# Patient Record
Sex: FEMALE | URBAN - METROPOLITAN AREA
[De-identification: names, ages, dates, MRNs, and addresses within clinical notes are randomized per-mention and may not be internally consistent; named-entity substitution may affect disease eponyms.]

---
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
## 2024-03-12 ENCOUNTER — OFFICE VISIT (OUTPATIENT)
Dept: FAMILY MEDICINE CLINIC | Facility: CLINIC | Age: 19
End: 2024-03-12
Payer: COMMERCIAL

## 2024-03-12 VITALS
SYSTOLIC BLOOD PRESSURE: 114 MMHG | RESPIRATION RATE: 16 BRPM | BODY MASS INDEX: 33.05 KG/M2 | WEIGHT: 196 LBS | DIASTOLIC BLOOD PRESSURE: 70 MMHG | OXYGEN SATURATION: 98 % | TEMPERATURE: 98 F | HEIGHT: 64.5 IN | HEART RATE: 86 BPM

## 2024-03-12 DIAGNOSIS — Z00.00 ROUTINE MEDICAL EXAM: Primary | ICD-10-CM

## 2024-03-12 DIAGNOSIS — E66.09 CLASS 1 OBESITY DUE TO EXCESS CALORIES WITHOUT SERIOUS COMORBIDITY WITH BODY MASS INDEX (BMI) OF 33.0 TO 33.9 IN ADULT: ICD-10-CM

## 2024-03-12 PROCEDURE — 99395 PREV VISIT EST AGE 18-39: CPT | Performed by: FAMILY MEDICINE

## 2024-03-12 NOTE — PROGRESS NOTES
Chief Complaint   Patient presents with    Physical       HPI:  Isatu Hudson is a 18 year old female here today for preventative visit.      Imms- up to date with tdap & flu. Will discuss covid.      Cervical cancer screening- Never had a pap yet.       Breast cancer screening- Mgma with breast cancer. Jewels's sister had ovarian cancer.      Colon cancer screening- No early family h/o colon cancer.       Osteoporosis screening- no reason identified for early screening with dexa      Diet/exercise- stresses over her weight. Works on it x 1 mo then when doesn't see progress she gets upset and gives up.  Cooking for herself nightly and making better food choices.   Goal- walk 3d/wk 1-3mi  -loose 5# in 1 month  -eat healthier getting a fruit/veggie    Breakfast- yogurt, fruit, coffee little bit of cre  am.   Lunch- avocado toast with egg, water  Dinner- pork tenderloin, brussel sprouts, salad, water  Snack- chocolate ice cream 2 scoops        Dental/Eye Check up-  Recommended pt see dentist once every 6 months for a cleaning and once every year for an eye exam.        History reviewed. No pertinent past medical history.  Past Surgical History:   Procedure Laterality Date    WISDOM TEETH REMOVED Bilateral 11/27/2023     No current outpatient medications on file prior to visit.     No current facility-administered medications on file prior to visit.     No Known Allergies  Social History     Socioeconomic History    Marital status: Single     Spouse name: Not on file    Number of children: Not on file    Years of education: Not on file    Highest education level: Not on file   Occupational History    Occupation:  Worker   Tobacco Use    Smoking status: Never    Smokeless tobacco: Never   Vaping Use    Vaping Use: Never used   Substance and Sexual Activity    Alcohol use: Never    Drug use: Never    Sexual activity: Yes     Partners: Male     Birth control/protection: Condom   Other Topics Concern     Caffeine Concern No    Exercise No    Seat Belt No    Special Diet No    Stress Concern No    Weight Concern No   Social History Narrative    Not on file     Social Determinants of Health     Financial Resource Strain: Not on file   Food Insecurity: Not on file   Transportation Needs: Not on file   Physical Activity: Not on file   Stress: Not on file   Social Connections: Not on file   Housing Stability: Not on file     Family History   Problem Relation Age of Onset    No Known Problems Mother     No Known Problems Father     No Known Problems Brother     No Known Problems Brother     Breast Cancer Maternal Grandmother 60    Heart Attack Maternal Grandfather     No Known Problems Paternal Grandmother     Other (hungtington's disease) Paternal Grandfather     Ovarian Cancer Other        Review of Systems - All systems reviewed and negative except for HPI    PHYSICAL EXAM:  /70   Pulse 86   Temp 98.3 °F (36.8 °C) (Temporal)   Resp 16   Ht 5' 4.5\" (1.638 m)   Wt 196 lb (88.9 kg)   LMP 03/02/2024 (Approximate)   SpO2 98%   BMI 33.12 kg/m²   GENERAL APPEARANCE:  Alert, no acute distress, appears stated age  HEENT:  Head- Normocephalic, atraumatic.    Eyes- Extraocular movements intact, pupils equally round and reactive to light,  conjunctivae normal.    Ears- Tympanic membranes intact bilaterally.    Nose- Patent, normal septum and turbinates.    Mouth/Throat- Normal oral mucosa, throat non-erythematous.  NECK:  No submental, submandibular, ant/post cervical lymphadenopathy. No thyromegaly or masses.  PULMONARY:  Lungs clear to auscultation bilaterally. No wheezes, rales, or rhonchi. Normal respiratory effort.  CARDIOVASCULAR:  Regular rate and rhythm. No murmurs, gallops, or rubs.  ABDOMEN:  + bowel sounds, soft, nontender, nondistended. No hepatomegaly.  MUSCULOSKELETAL: Strength of upper and lower extremities 5/5 bilaterally. Normal gait.  NEUROLOGIC:  Cranial nerves 2-12 grossly intact.  PSYCHIATRIC:   Normal mood, affect, and hygiene.     ASSESSMENT/PLAN:    1. Routine medical exam    2. Class 1 obesity due to excess calories without serious comorbidity with body mass index (BMI) of 33.0 to 33.9 in adult  -discussed healthy diet, having fresh fruit/veggie platter ready for the week  -discussed making realistic goals and celebrating progress        Patient verbalized understanding and agrees to plan.      Return for lifestlye changes if still struggling when back from end of semester.

## 2025-05-15 ENCOUNTER — OFFICE VISIT (OUTPATIENT)
Facility: CLINIC | Age: 20
End: 2025-05-15
Payer: COMMERCIAL

## 2025-05-15 VITALS
BODY MASS INDEX: 36.43 KG/M2 | DIASTOLIC BLOOD PRESSURE: 76 MMHG | SYSTOLIC BLOOD PRESSURE: 112 MMHG | HEIGHT: 64.5 IN | WEIGHT: 216 LBS

## 2025-05-15 DIAGNOSIS — Z12.4 SCREENING FOR CERVICAL CANCER: ICD-10-CM

## 2025-05-15 DIAGNOSIS — Z80.3 FAMILY HISTORY OF BREAST CANCER: ICD-10-CM

## 2025-05-15 DIAGNOSIS — Z11.3 SCREENING FOR STDS (SEXUALLY TRANSMITTED DISEASES): ICD-10-CM

## 2025-05-15 DIAGNOSIS — Z01.419 WELL WOMAN EXAM WITH ROUTINE GYNECOLOGICAL EXAM: Primary | ICD-10-CM

## 2025-05-15 PROCEDURE — 99385 PREV VISIT NEW AGE 18-39: CPT

## 2025-05-15 NOTE — PROGRESS NOTES
GYN H&P     Genetic questionnaire reviewed with the patient and she will be referred for genetic counseling if the questionnaire had any positive results.    The [facility] intake form was also reviewed regarding contraception, menstrual periods, urinary health, and vaginal / sexual health    5/15/2025  9:38 AM    Chief Complaint   Patient presents with    Physical     Pt is here for annual exam. To have first pap today       HPI: Isatu is a 19 year old  Patient's last menstrual period was 2025 (exact date).  (contraception: condoms) here for her annual gyn exam.     She has no complaints.   Menses are regular, Q26 days and lasting 5 days in length. Denies any pelvic or breast complaints.  Satisfied with current contraception. Declines hormonal contraception.    Family hx of breast cancer in her maternal grandmother at age 45. Reports her mother has had genetic testing done and was negative. The patient herself has not had genetic testing done but is not interested at this time.    Previous encounters and chart reviewed.     OB History    Para Term  AB Living   0 0 0 0 0 0   SAB IAB Ectopic Multiple Live Births   0 0 0 0 0       GYN hx:   Menarche: 9  Period Cycle (Days): 26-28  Period Duration (Days): 5  Use of Birth Control (if yes, specify type): Condoms  Hx Prior Abnormal Pap: No  Follow Up Recommendation: today      Past Medical History[1]  Past Surgical History[2]  Allergies[3]  Medications Ordered Prior to Encounter[4]  Family History[5]  Social History     Socioeconomic History    Marital status: Single     Spouse name: Not on file    Number of children: Not on file    Years of education: Not on file    Highest education level: Not on file   Occupational History    Occupation:  Worker   Tobacco Use    Smoking status: Never    Smokeless tobacco: Never   Vaping Use    Vaping status: Never Used   Substance and Sexual Activity    Alcohol use: Yes     Alcohol/week:  1.0 standard drink of alcohol     Types: 1 Standard drinks or equivalent per week    Drug use: Never    Sexual activity: Yes     Partners: Male     Birth control/protection: Condom   Other Topics Concern    Caffeine Concern No    Exercise No    Seat Belt No    Special Diet No    Stress Concern No    Weight Concern No   Social History Narrative    Not on file     Social Drivers of Health     Food Insecurity: No Food Insecurity (5/15/2025)    NCSS - Food Insecurity     Worried About Running Out of Food in the Last Year: No     Ran Out of Food in the Last Year: No   Transportation Needs: No Transportation Needs (5/15/2025)    NCSS - Transportation     Lack of Transportation: No   Stress: Not on file   Housing Stability: Not At Risk (5/15/2025)    NCSS - Housing/Utilities     Has Housing: Yes     Worried About Losing Housing: No     Unable to Get Utilities: No       ROS:     Review of Systems:  General: denies fevers, chills, fatigue and malaise.   Eyes: no visual changes, denies headaches  ENT: no complaints, denies earaches, runny nose, epistaxis, throat pain or sore throat  Respiratory: denies SOB, dyspnea, cough or wheezing  Cardiovascular: denies chest pain, palpitations, exercise intolerance   GI: denies abdominal pain, diarrhea, constipation  : no complaints, denies dysuria, increased urinary frequency. Menses regular, no dysmenorrhea, no menorrhagia, no dyspareunia   Hematological/lymphatic: denies history of excessive bleeding or bruising, denies dizziness, lightheadedness.   Breast: denies rashes, skin changes, pain, lumps or discharge   Psychiatric: denies depression, changes in sleep patterns, anxiety  Endocrine: denies hot or cold intolerance, mood changes   Neurological: denies changes in sight, smell, hearing or taste. Denies seizures or tremors  Immunological: denies allergies, denies anaphylaxis, or swollen lymph nodes  Musculoskeletal: denies joint pain, morning stiffness, decreased range of motion          O /76   Ht 64.5\"   Wt 216 lb (98 kg)   LMP 05/03/2025 (Exact Date)   BMI 36.50 kg/m²         Wt Readings from Last 6 Encounters:   05/15/25 216 lb (98 kg)   03/12/24 196 lb (88.9 kg)   07/12/22 188 lb (85.3 kg) (96%, Z= 1.78)*     * Growth percentiles are based on [facility] (Girls, 2-20 Years) data.     Exam:   GENERAL: well developed, well nourished, in no apparent distress, oriented.  SKIN: no rashes, no suspicious lesions  HEENT: normal  NECK: supple; no thyromegaly, no adenopathy  LUNGS: clear to auscultation  CARDIOVASCULAR: normal S1, S2, RRR  BREASTS: soft, nontender, no palpable masses or nodes, no nipple discharge, no skin changes, no axillary adenopathy  ABDOMEN: no scars,  soft, non distended; non tender, no masses  PELVIC: External Genitalia: Normal appearing, no lesions.    Vagina: normal pink mucosa, no lesions, normal clear discharge.    Bladder well supported.  No  anterior or posterior hernias    Cervix: nulliparous, no lesions , No CMT     Uterus: AVAF, mobile, non tender, normal size    Adnexa: non tender, no masses, normal size    Rectal: deferred  EXTREMITIES:  non tender without edema             Patient counseled on:    Diet/exercise.      Self Breast Exams     Safe sex practices / and living environment          Pap: collected today  GC/Chlamydia:  collected via pap     A/P: Patient is 19 year old female with no complaints. Here for well woman exam. Pap collected.    Meds This Visit:    Requested Prescriptions      No prescriptions requested or ordered in this encounter       1. Screening for cervical cancer  - ThinPrep PAP Smear B; Future  - Chlamydia/Gc Amplification    2. Screening for STDs (sexually transmitted diseases)  - ThinPrep PAP Smear B; Future  - Chlamydia/Gc Amplification    3. Well woman exam with routine gynecological exam  - ThinPrep PAP Smear B; Future  - Chlamydia/Gc Amplification    4. Family history of breast cancer      Return in about 1 year (around  5/15/2026) for Well Woman Exam.    Yeimy Kraft, APRN   5/15/2025  9:38 AM       This note was created by [employer] (SAM) voice recognition. Errors in content may be related to improper recognition by the system; efforts to review and correct have been done but errors may still exist. Please contact me with any questions.    Note to patient and family   The 21st Century Cures Act makes medical notes available to patients in the interest of transparency.  However, please be advised that this is a medical document.  It is intended as [id number] communication.  It is written in medical language which may contain abbreviations or verbiage that are technical and unfamiliar.  It may appear blunt or direct.  Medical documents are intended to carry relevant information, facts as evident, and the clinical opinion of the practitioner.           [1] History reviewed. No pertinent past medical history.  [2]   Past Surgical History:  Procedure Laterality Date    [place] teeth removed Bilateral 11/27/2023   [3] No Known Allergies  [4]   No current outpatient medications on file prior to visit.     No current facility-administered medications on file prior to visit.   [5]   Family History  Problem Relation Age of Onset    No Known Problems Mother     No Known Problems Father     No Known Problems Brother     No Known Problems Brother     Breast Cancer Maternal Grandmother 60    Heart Attack Maternal Grandfather     No Known Problems Paternal Grandmother     Other (hungtington's disease) Paternal Grandfather     Ovarian Cancer Other

## 2025-05-16 ENCOUNTER — MED REC SCAN ONLY (OUTPATIENT)
Facility: CLINIC | Age: 20
End: 2025-05-16